# Patient Record
Sex: MALE | Race: OTHER | Employment: FULL TIME | URBAN - METROPOLITAN AREA
[De-identification: names, ages, dates, MRNs, and addresses within clinical notes are randomized per-mention and may not be internally consistent; named-entity substitution may affect disease eponyms.]

---

## 2021-07-20 ENCOUNTER — TELEPHONE (OUTPATIENT)
Dept: SURGERY | Facility: CLINIC | Age: 41
End: 2021-07-20

## 2021-07-21 NOTE — TELEPHONE ENCOUNTER
Second call. This RN called patient to remind him of his appt with Dr Ming Lassiter on 8/3 for low testosterone. No answer. Left message. Note, this RN needs copy of the lab.  Awaiting call back.

## 2021-10-26 ENCOUNTER — OFFICE VISIT (OUTPATIENT)
Dept: SURGERY | Facility: CLINIC | Age: 41
End: 2021-10-26
Payer: COMMERCIAL

## 2021-10-26 DIAGNOSIS — R82.90 URINE FINDING: Primary | ICD-10-CM

## 2021-10-26 DIAGNOSIS — N52.9 ERECTILE DYSFUNCTION, UNSPECIFIED ERECTILE DYSFUNCTION TYPE: ICD-10-CM

## 2021-10-26 PROCEDURE — 81003 URINALYSIS AUTO W/O SCOPE: CPT | Performed by: UROLOGY

## 2021-10-26 PROCEDURE — 99203 OFFICE O/P NEW LOW 30 MIN: CPT | Performed by: UROLOGY

## 2021-10-26 RX ORDER — TADALAFIL 20 MG/1
TABLET ORAL
Qty: 30 TABLET | Refills: 5 | Status: SHIPPED | OUTPATIENT
Start: 2021-10-26

## 2021-10-26 NOTE — PATIENT INSTRUCTIONS
Evaluating Erectile Dysfunction     Doctor talking to man. Many men feel embarrassed to talk to a healthcare provider about erectile dysfunction (ED). This common problem can be treated, but only if your provider knows about it.  Your provider will like And counseling may be recommended to talk about underlying emotional issues. Jewell last reviewed this educational content on 7/1/2019  © 8981-5855 [employer]. All rights reserved.  This information is not intended as a substitute for pro make sure that they are safe to use with your ED medicine.     Possible side effects of oral ED medicines  · Headache  · Facial flushing  · Runny or stuffy nose  · Indigestion  · Distortion of your color vision for a short time  · Dizziness  · Sudden vision follow your healthcare professional's instructions. Surgery for Erectile Dysfunction (Implants)  Surgery for erectile dysfunction (ED) is not common, but it may be the best treatment in some cases.  During surgery, your healthcare provider places an marijuana  · Drinking too much alcohol  · Side effects from medicines  · Injuries to nerves or blood vessels  · Emotional issues, such as stress or relationship problems  ED can be treated  Prescription medicines for ED are available.  These medicines often soft medicine  pellet. The medicine is absorbed into your penis. The medicines relax blood vessels so they can fill with blood. In about 10 minutes, your penis can become hard enough for sex. To keep your erection, you may need to use a tension ring.   · Ri sitting together on couch, smiling. Being intimate means being close as a couple, with sex as just one part of intimacy. A hug, a kind remark, or a gift can be very romantic, even if sex doesn’t follow. So renew your intimacy along with your sex life.  [facility] Erection Therapy  The steps below show how to get and keep an erection with a vacuum erection therapy system. Getting started  · Place the rubber tension ring on the open end of the cylinder. · Apply water-based lubricant to the end of the cylinder.   · Erection  The penis is made up of spongy tissue that holds blood. When the penis is soft, blood flows in and out of the tissue. During sexual excitement, extra blood flows into the tissue. The extra blood makes the tissue swell.  The penis then becomes hard rights reserved. This information is not intended as a substitute for professional medical care. Always follow your healthcare professional's instructions.

## 2021-10-26 NOTE — PROGRESS NOTES
Rooming Clinician:     Asmita Green is a 37year old male. Patient presents with:  Consult: c/o Low Testosterone         HPI:       Comes to the office with his spous.   Wwith a history of erectile dysfunction that has been present for at least 10 or even nourished,in no apparent distress  SKIN: no rashes,no suspicious lesions  HEENT: atraumatic, normocephalic,ears and throat are clear  NECK: supple  LUNGS: normal respiratory motion without distress  CARDIO: normal peripheral perfusion  ABDOMEN: no masses, complications.   I discussed the used to testosterone supplements when appropriate as well as other forms of treatment for erectile dysfunction which could include bands placed at the base of the penis, pharmacologic injection programs, vacuum pumps, intrau

## 2021-10-29 ENCOUNTER — LAB ENCOUNTER (OUTPATIENT)
Dept: LAB | Age: 41
End: 2021-10-29
Attending: UROLOGY
Payer: COMMERCIAL

## 2021-10-29 DIAGNOSIS — N52.9 ERECTILE DYSFUNCTION, UNSPECIFIED ERECTILE DYSFUNCTION TYPE: ICD-10-CM

## 2021-10-29 PROCEDURE — 36415 COLL VENOUS BLD VENIPUNCTURE: CPT

## 2021-10-29 PROCEDURE — 80053 COMPREHEN METABOLIC PANEL: CPT

## 2021-11-09 ENCOUNTER — TELEPHONE (OUTPATIENT)
Dept: SURGERY | Facility: CLINIC | Age: 41
End: 2021-11-09

## 2021-11-09 NOTE — TELEPHONE ENCOUNTER
Called patient and left message.  Patient to informed of most recent labs per Kip Deter Nutrition Diagnosis:  Inadequate energy intake    Related to (etiology):   Decreased appetite    Signs and Symptoms (as evidenced by):   Pt consuming 50% EEN and EPN    Interventions/Recommendations (treatment strategy):  See recs    Nutrition Diagnosis Status:   New

## 2023-02-20 ENCOUNTER — TELEPHONE (OUTPATIENT)
Dept: SURGERY | Facility: CLINIC | Age: 43
End: 2023-02-20

## 2025-05-19 ENCOUNTER — TELEPHONE (OUTPATIENT)
Dept: GASTROENTEROLOGY | Age: 45
End: 2025-05-19

## 2025-05-21 ENCOUNTER — APPOINTMENT (OUTPATIENT)
Dept: UROLOGY | Age: 45
End: 2025-05-21